# Patient Record
Sex: MALE | Race: OTHER
[De-identification: names, ages, dates, MRNs, and addresses within clinical notes are randomized per-mention and may not be internally consistent; named-entity substitution may affect disease eponyms.]

---

## 2018-10-02 ENCOUNTER — HOSPITAL ENCOUNTER (EMERGENCY)
Dept: HOSPITAL 72 - EMR | Age: 33
Discharge: HOME | End: 2018-10-02
Payer: MEDICAID

## 2018-10-02 VITALS — WEIGHT: 170 LBS | BODY MASS INDEX: 25.18 KG/M2 | HEIGHT: 69 IN

## 2018-10-02 VITALS — SYSTOLIC BLOOD PRESSURE: 123 MMHG | DIASTOLIC BLOOD PRESSURE: 75 MMHG

## 2018-10-02 VITALS — DIASTOLIC BLOOD PRESSURE: 76 MMHG | SYSTOLIC BLOOD PRESSURE: 128 MMHG

## 2018-10-02 VITALS — SYSTOLIC BLOOD PRESSURE: 128 MMHG | DIASTOLIC BLOOD PRESSURE: 76 MMHG

## 2018-10-02 DIAGNOSIS — F10.129: Primary | ICD-10-CM

## 2018-10-02 PROCEDURE — 99283 EMERGENCY DEPT VISIT LOW MDM: CPT

## 2018-10-02 NOTE — EMERGENCY ROOM REPORT
History of Present Illness


General


Chief Complaint:  Alcohol Intoxication


Source:  Patient, EMS





Present Illness


HPI


This patient is well-known to [facility].  He has a history of 

alcohol abuse.  He is brought in by EMS from the street passed out with the 

smell of alcohol around him.  There is no other history available.


Allergies:  


Coded Allergies:  


     No Known Allergies (Unverified , 9/28/18)





Patient History


Past Medical History:  none, other - ETOH abuse


Social History:  Reports: alcohol use


Reviewed Nursing Documentation:  PMH: Agreed; PSxH: Agreed





Nursing Documentation-PM


Past Medical History:  No History, Except For





Review of Systems


All Other Systems:  negative except mentioned in HPI





Physical Exam





Vital Signs








  Date Time  Temp Pulse Resp B/P (MAP) Pulse Ox O2 Delivery O2 Flow Rate FiO2


 


10/2/18 08:55 98.0 87 18 123/75 96 Room Air  





 98.0       








Sp02 EP Interpretation:  reviewed, normal


General Appearance:  no apparent distress, non-toxic, Stupor


Head:  normocephalic, atraumatic


Eyes:  bilateral eye normal inspection, bilateral eye PERRL


ENT:  hearing grossly normal, normal pharynx, no angioedema, normal voice


Neck:  full range of motion, supple/symm/no masses


Respiratory:  chest non-tender, lungs clear, normal breath sounds, no 

respiratory distress, no retraction, no accessory muscle use, speaking full 

sentences


Cardiovascular #1:  regular rate, rhythm, no edema


Gastrointestinal:  normal bowel sounds, non tender, soft, non-distended, no 

guarding, no rebound


Rectal:  deferred


Musculoskeletal:  back normal, normal range of motion, non-tender


Neurologic:  responsive, sensory intact, speech normal, grossly normal


Skin:  normal color, no rash, warm/dry, well hydrated





Medical Decision Making


Diagnostic Impression:  


 Primary Impression:  


 Acute alcoholic intoxication


ER Course


This patient presents with acute alcoholic intoxication.  There is no evidence 

of trauma or injury on physical examination of this patient.  The patient was 

allowed to sober up in the emergency department and was able to ambulate and 

articulate desire to go home.  The patient was clinically sober at the time of 

discharge.  No acute emergency medical condition is identified.  The patient 

was educated on the dangers of alcohol intoxication and abuse.  The patient was 

given a list of the local rehabilitation clinics.





Last Vital Signs








  Date Time  Temp Pulse Resp B/P (MAP) Pulse Ox O2 Delivery O2 Flow Rate FiO2


 


10/2/18 08:55  90 18 132/90 94 Room Air  


 


10/2/18 08:55 98.0       





 98.0       








Status:  improved


Disposition:  HOME, SELF-CARE


Condition:  Improved


Patient Instructions:  Alcohol Abuse and Nutrition, Alcohol Intoxication, Easy-

to-Read











Birdie Ac DO Oct 2, 2018 10:14